# Patient Record
Sex: FEMALE | Race: WHITE | NOT HISPANIC OR LATINO | Employment: FULL TIME | URBAN - METROPOLITAN AREA
[De-identification: names, ages, dates, MRNs, and addresses within clinical notes are randomized per-mention and may not be internally consistent; named-entity substitution may affect disease eponyms.]

---

## 2017-01-05 ENCOUNTER — DOCUMENTATION (OUTPATIENT)
Dept: OBSTETRICS AND GYNECOLOGY | Facility: CLINIC | Age: 37
End: 2017-01-05

## 2017-01-05 NOTE — PROGRESS NOTES
Pt called and scheduled her annual exam on February 13th.  She nursed her daughter full time until September 2016, and stopped completely just two weeks ago.  She started spotting off and on after cutting back on nursing.  Since stopping nursing altogether, she has spotted everyday.  She had a Mirena IUD inserted after delivery. She just wants to make sure it's OK to wait until February until she is seen.  Pt was reassured that this spotting is not out of the norm with an IUD and also with someone who has just stopped nursing. We will see her in a month.

## 2017-01-19 PROBLEM — Z01.419 WELL WOMAN EXAM WITH ROUTINE GYNECOLOGICAL EXAM: Chronic | Status: ACTIVE | Noted: 2017-01-19

## 2017-02-13 ENCOUNTER — OFFICE VISIT (OUTPATIENT)
Dept: OBSTETRICS AND GYNECOLOGY | Facility: CLINIC | Age: 37
End: 2017-02-13

## 2017-02-13 VITALS
DIASTOLIC BLOOD PRESSURE: 66 MMHG | RESPIRATION RATE: 14 BRPM | WEIGHT: 128 LBS | BODY MASS INDEX: 22.68 KG/M2 | SYSTOLIC BLOOD PRESSURE: 106 MMHG | HEIGHT: 63 IN

## 2017-02-13 DIAGNOSIS — Z01.419 WELL WOMAN EXAM WITH ROUTINE GYNECOLOGICAL EXAM: Primary | Chronic | ICD-10-CM

## 2017-02-13 PROCEDURE — 99395 PREV VISIT EST AGE 18-39: CPT | Performed by: OBSTETRICS & GYNECOLOGY

## 2017-02-13 RX ORDER — PRENATAL VIT NO.126/IRON/FOLIC 28MG-0.8MG
TABLET ORAL DAILY
COMMUNITY
End: 2017-07-19

## 2017-02-13 NOTE — PROGRESS NOTES
Subjective   Chief Complaint   Patient presents with   • Gynecologic Exam     Lilliam Wilkins is a 36 y.o. year old  presenting to be seen for her annual exam.     SEXUAL Hx:  She is currently sexually active.  In the past year there has not been new sexual partners.    Condoms are not typically used.  She would not like to be screened for STD's at today's exam.  Current birth control method: IUD - Mirena.  She is happy with her current method of contraception and does not want to discuss alternative methods of contraception.  MENSTRUAL Hx:  No LMP recorded. Patient has had an implant.  In the past 6 months her cycles have been infrequent weaned 2017.   Her menstrual flow is light.   Each month on average there are roughly 0 day(s) of very heavy flow.    Intermenstrual bleeding is absent.    Post-coital bleeding is absent.  Dysmenorrhea: is not affecting her activities of daily living  PMS: is not affecting her activities of daily living  Her cycles are not a source of concern for her that she wishes to discuss today.  HEALTH Hx:  She exercises regularly: no (and has no plans to become more active).  She wears her seat belt:yes.  She has concerns about domestic violence: no.  OTHER COMPLAINTS:  none    The following portions of the patient's history were reviewed and updated as appropriate:problem list, current medications, allergies, past family history, past medical history, past social history and past surgical history.    Smoking status: Never Smoker                                                                 Smokeless status: Not on file                       Review of Systems  Consitutional POS: nothing reported    NEG: anorexia or night sweats   Gastointestinal POS: nothing reported    NEG: bloating, change in bowel habits, melena or reflux symptoms   Genitourinary POS: nothing reported    NEG: dysuria or hematuria   Integument POS: nothing reported    NEG: moles that are changing in size, shape,  "color or rashes   Breast POS: nothing reported    NEG: persistent breast lump, skin dimpling or nipple discharge        Objective   Visit Vitals   • /66   • Resp 14   • Ht 63\" (160 cm)   • Wt 128 lb (58.1 kg)   • Breastfeeding No   • BMI 22.67 kg/m2       General:  well developed; well nourished  no acute distress   Skin:  No suspicious lesions seen   Thyroid: normal to inspection and palpation   Breasts:  Examined in supine position  Symmetric without masses or skin dimpling  Nipples normal without inversion, lesions or discharge  There are no palpable axillary nodes   Abdomen: soft, non-tender; no masses  no umbilical or inginual hernias are present  no hepato-splenomegaly   Pelvis: Clinical staff was present for exam  External genitalia:  normal appearance of the external genitalia including Bartholin's and Ojus's glands.  :  urethral meatus normal; urethral hypermobility is absent.  Vaginal:  normal pink mucosa without prolapse or lesions.  Cervix:  normal appearance. IUD string present - 2 cms in length;  Uterus:  normal size, shape and consistency. retroverted;  Adnexa:  non palpable bilaterally.  Rectal:  digital rectal exam not performed; anus visually normal appearing.        Assessment   1. Normal GYN exam  2. H/O ASCUS with (-) HPV     Plan   1. Pap was done today.  If she does not receive the results of the Pap within 2 weeks  time, she was instructed to call to find out the results.  I explained to Lilliam that the recommendations for Pap smear interval in a low risk patient's has lengthened to 3 years time.  I encouraged her to be seen yearly for a full physical exam including breast and pelvic exam even during the off years when PAP's will not be performed.  2. Follow up for annual exam 1 year         This note was electronically signed.    Red Crespo M.D.  February 13, 2017  "

## 2017-07-19 ENCOUNTER — OFFICE VISIT (OUTPATIENT)
Dept: OBSTETRICS AND GYNECOLOGY | Facility: CLINIC | Age: 37
End: 2017-07-19

## 2017-07-19 VITALS
DIASTOLIC BLOOD PRESSURE: 62 MMHG | RESPIRATION RATE: 14 BRPM | WEIGHT: 127 LBS | SYSTOLIC BLOOD PRESSURE: 104 MMHG | BODY MASS INDEX: 22.5 KG/M2

## 2017-07-19 DIAGNOSIS — Z30.40 CONTRACEPTIVE USE: Primary | ICD-10-CM

## 2017-07-19 DIAGNOSIS — N92.6 IRREGULAR MENSES: ICD-10-CM

## 2017-07-19 PROCEDURE — 99213 OFFICE O/P EST LOW 20 MIN: CPT | Performed by: OBSTETRICS & GYNECOLOGY

## 2017-07-19 RX ORDER — CLINDAMYCIN PHOSPHATE 10 UG/ML
LOTION TOPICAL
COMMUNITY
Start: 2017-07-06 | End: 2022-02-17

## 2017-07-19 RX ORDER — CETIRIZINE HYDROCHLORIDE 10 MG/1
10 TABLET ORAL DAILY
COMMUNITY

## 2017-07-19 RX ORDER — ESCITALOPRAM OXALATE 10 MG/1
TABLET ORAL
COMMUNITY
Start: 2017-07-07 | End: 2018-11-19 | Stop reason: SDUPTHER

## 2017-07-19 NOTE — PROGRESS NOTES
Subjective   Chief Complaint   Patient presents with   • Contraception     discuss options     Lilliam Wilkins is a 37 y.o. year old .  Patient's last menstrual period was 2017 (approximate).  She presents to be seen because of trouble with her Mirena.  She's having issues with frequent bleeding.  She has a definite period monthly.  In the weeks that she is not supposed to be bleeding she'll bleed about half of those days.  She did have an ultrasound approximately 8 weeks after placement showing normal IUD placement.    There 100% certain done with conception.  He is willing to do vasectomy.    When she is not on birth control or pregnant her cycles are somewhat short but not excessively heavy or painful.  Been quite a while since she is used any form of contraception otherwise.  She think she would be a good pill taker potentially.    The following portions of the patient's history were reviewed and updated as appropriate:current medications and allergies    Smoking status: Never Smoker                                                                 Smokeless status: Not on file                            Objective   /62  Resp 14  Wt 127 lb (57.6 kg)  LMP 2017 (Approximate)  Breastfeeding? No  BMI 22.5 kg/m2    Lab Review   No data reviewed    Imaging   Pelvic ultrasound report        Assessment   1. Irregular menses with IUD  2. Desires alternative contraceptive methods  3. Irregular menses - affecting her activities of daily living.     Plan   1. Birth control options reviewed and compare including vasectomy, tubal, bilateral salpingectomy.  Additionally we talked about pros and cons of oral contraceptives, NuvaRing and Ortho Evra.  She wants to talk to her  and will get back to us.  2. I did explain that a short course of oral contraceptives may actually resolve the irregular menses that are related to the IUD.  She like to try this in the interim  3. Follow up PRN      New  Medications Ordered This Visit   Medications   • norethindrone-ethinyl estradiol (NECON 1/35, 28,) 1-35 MG-MCG per tablet     Sig: Take 1 tablet by mouth Daily.     Dispense:  28 tablet     Refill:  2          This note was electronically signed.    Red Crespo M.D.  July 19, 2017    Total time spent today with Lilliam  was 30 minutes (level 4).  Off this time, 100% was spent face-to-face time coordinating care, answering her questions and counseling regarding contraceptive choices and efficacy rates and pathophysiology of her presenting problem along with plans for any diagnositc work-up and treatment.

## 2017-07-19 NOTE — PROGRESS NOTES
"IUD Removal    Date of procedure:  7/19/2017    Risks and benefits discussed? yes  All questions answered? yes  Consents given by The patient  Written consent obtained? yes  Reason for removal: {IUD Removal Reason:21193}    Local anesthesia used:  {Local anesthesia used - Yes/No:79266}    Procedure documentation:    A speculum was placed in order to view the cervix.  A tenaculum {DID/DID NOT:40132} need to be placed on the anterior cervical lip.  Cervical dilation {DID/DID NOT:05823} need to be performed in order to access the string.  The IUD string {ACTIONS; WAS/WAS NOT:14911} easily seen.  The string was grasped and the IUD was removed {WITH OR WITHOUT:84760} difficulty.  The IUD {DID/DID NOT:42125} appear to be adherent to the uterine cavity. It {Actions; Was/Was No:35109} removed intact.    She tolerated the procedure without any difficulty.     Post procedure instructions: Patient notified to call with heavy bleeding, fever or increasing pain.    Follow up {F/U reason:85757::\"for annual exam\"} *** {follow-up time:22868}    This note was electronically signed.    Red Crespo M.D.  July 19, 2017      "

## 2018-03-21 ENCOUNTER — OFFICE VISIT (OUTPATIENT)
Dept: OBSTETRICS AND GYNECOLOGY | Facility: CLINIC | Age: 38
End: 2018-03-21

## 2018-03-21 VITALS
DIASTOLIC BLOOD PRESSURE: 68 MMHG | WEIGHT: 133 LBS | BODY MASS INDEX: 23.56 KG/M2 | SYSTOLIC BLOOD PRESSURE: 106 MMHG | RESPIRATION RATE: 14 BRPM

## 2018-03-21 DIAGNOSIS — N64.4 MASTODYNIA: Primary | ICD-10-CM

## 2018-03-21 PROBLEM — Z01.419 WELL WOMAN EXAM WITH ROUTINE GYNECOLOGICAL EXAM: Status: ACTIVE | Noted: 2017-01-19

## 2018-03-21 PROCEDURE — 99213 OFFICE O/P EST LOW 20 MIN: CPT | Performed by: OBSTETRICS & GYNECOLOGY

## 2018-03-21 NOTE — PROGRESS NOTES
Subjective   Chief Complaint   Patient presents with   • Breast Problem     left breast      Lilliam Wilkins is a 37 y.o. year old  presenting to be seen because of New onset left breast heaviness.  She's noticed nothing else's.  Specifically there's been no changes in the overlying skin or nipple discharge.  There is no palpable lumps.  Symptoms have been present for less than 1 week's time.    OTHER THINGS SHE WANTS TO DISCUSS TODAY:  Nothing else    The following portions of the patient's history were reviewed and updated as appropriate:current medications, allergies and past family history    Smoking status: Never Smoker                                                                 Smokeless tobacco: Not on file                       Review of Systems  Constitutional POS: nothing reported    NEG: anorexia or night sweats   Genitourinary POS: nothing reported    NEG: dysuria or hematuria   Gastointestinal POS: nothing reported    NEG: bloating, change in bowel habits, melena or reflux symptoms   Integument POS: nothing reported    NEG: moles that are changing in size, shape, color or rashes   Breast POS: see HPI    NEG: persistent breast lump, skin dimpling or nipple discharge        Objective   /68   Resp 14   Wt 60.3 kg (133 lb)   Breastfeeding? No   BMI 23.56 kg/m²     General:  well developed; well nourished  no acute distress   Breasts:  Examined in supine position  Symmetric without masses or skin dimpling  Nipples normal without inversion, lesions or discharge  There are no palpable axillary nodes  Fibrocystic changes are present both breasts without a discrete mass     Lab Review   No data reviewed    Imaging   No data reviewed       Assessment   1. Fibrocystic change of the breast w/o a discretely palpable mass     Plan   1. I reviewed with Lilliam that caffeine reduction may help reduce breast pains associated with fibrocystic change.  Furthermore I explained that vitamin E  400 units  twice daily may help to lessen the associated breast pains.  If she notices a discretely palpable lump or bloody nipple discharge associated with her breast pain, we need to be notified so that further testing can be scheduled.  2. The importance of keeping all planned follow-up and taking all medications as prescribed was emphasized.  3. Follow up PRN           This note was electronically signed.    Red Crespo M.D.  March 21, 2018    Note: Speech recognition transcription software may have been used to create portions of this document.  An attempt at proofreading has been made but errors in transcription could still be present.

## 2018-06-25 ENCOUNTER — OFFICE VISIT (OUTPATIENT)
Dept: OBSTETRICS AND GYNECOLOGY | Facility: CLINIC | Age: 38
End: 2018-06-25

## 2018-06-25 VITALS
SYSTOLIC BLOOD PRESSURE: 112 MMHG | DIASTOLIC BLOOD PRESSURE: 66 MMHG | RESPIRATION RATE: 14 BRPM | BODY MASS INDEX: 24.45 KG/M2 | WEIGHT: 138 LBS

## 2018-06-25 DIAGNOSIS — R14.0 BLOATING: ICD-10-CM

## 2018-06-25 DIAGNOSIS — Z01.419 WELL WOMAN EXAM WITH ROUTINE GYNECOLOGICAL EXAM: Primary | ICD-10-CM

## 2018-06-25 PROCEDURE — 99396 PREV VISIT EST AGE 40-64: CPT | Performed by: OBSTETRICS & GYNECOLOGY

## 2018-06-25 NOTE — PROGRESS NOTES
Subjective   Chief Complaint   Patient presents with   • Gynecologic Exam     Lilliam Wilkins is a 38 y.o. year old  presenting to be seen for her annual exam.     SEXUAL Hx:  She is currently sexually active.  In the past year there there has been NO new sexual partners.    Condoms are never used.  She would not like to be screened for STD's at today's exam.  Current birth control method: IUD - Mirena.  She is happy with her current method of contraception and does not want to discuss alternative methods of contraception.  MENSTRUAL Hx:  Patient's last menstrual period was 2018 (approximate).  In the past 6 months her cycles have been unpredictable infrequent.  Her menstrual flow is typically light.   Each month on average there are roughly 0 day(s) of very heavy flow.    Intermenstrual bleeding is absent.    Post-coital bleeding is absent.  Dysmenorrhea: is not affecting her activities of daily living  PMS: is not affecting her activities of daily living  Her cycles are not a source of concern for her that she wishes to discuss today.  HEALTH Hx:  She exercises regularly: yes.  She wears her seat belt: yes.  She has concerns about domestic violence: no.  OTHER THINGS SHE WANTS TO DISCUSS TODAY:  Nothing else    The following portions of the patient's history were reviewed and updated as appropriate:problem list, current medications, allergies, past family history, past medical history, past social history and past surgical history.    Smoking status: Never Smoker                                                              Smokeless tobacco: Never Used                        Review of Systems  Constitutional POS: nothing reported    NEG: anorexia or night sweats   Genitourinary POS: nothing reported    NEG: dysuria or hematuria      Gastointestinal POS: bloating    NEG: change in bowel habits, melena or reflux symptoms   Integument POS: nothing reported    NEG: moles that are changing in size, shape,  color or rashes   Breast POS: nothing reported    NEG: persistent breast lump, skin dimpling or nipple discharge        Objective   /66   Resp 14   Wt 62.6 kg (138 lb)   LMP 06/18/2018 (Approximate)   Breastfeeding? No   BMI 24.45 kg/m²     General:  well developed; well nourished  no acute distress   Skin:  No suspicious lesions seen   Thyroid: normal to inspection and palpation   Breasts:  Examined in supine position  Symmetric without masses or skin dimpling  Nipples normal without inversion, lesions or discharge  There are no palpable axillary nodes   Abdomen: soft, non-tender; no masses  no umbilical or inginual hernias are present  no hepato-splenomegaly   Pelvis: Clinical staff was present for exam  External genitalia:  normal appearance of the external genitalia including Bartholin's and Mabel's glands.  :  urethral meatus normal;  Vaginal:  normal pink mucosa without prolapse or lesions.  Cervix:  normal appearance. IUD string present - 2 cms in length;  Uterus:  normal size, shape and consistency. retroverted;  Adnexa:  normal bimanual exam of the adnexa.  Rectal:  digital rectal exam not performed; anus visually normal appearing.        Assessment   1. Normal GYN exam  2. Bloating.  This is a new finding that does need to be worked up further     Plan   1. Pap was not done today.  I explained to Lilliam that the recommendations for Pap smear interval in a low risk patient has lengthened to 3 years time.  I told Lilliam she still needs to be seen in our office yearly for a full physical including breast and pelvic exam.  2. Ultrasound needs to be done any time that is convenient for her.   3. She was encouraged to get yearly mammograms.  She should report any palpable breast lump(s) or skin changes regardless of mammographic findings.  I explained to Lilliam that notification regarding her mammogram results will come from the center performing the study.  Our office will not be routinely  calling with mammogram results.  It is her responsibility to make sure that the results from the mammogram are communicated to her by the breast center.  If she has any questions about the results, she is welcome to call our office anytime. The phone number to schedule a mammogram will be given to Lilliam today at the time of check out.  I explained that she should be able to call the center directly to schedule her screening mammogram without a physician's order.  So long as she gives them our name, a copy of the mammogram report should be sent to us for review.  4. The importance of keeping all planned follow-up and taking all medications as prescribed was emphasized.  5. Follow up after ultrasound.  If U/S normal consider checking TSH and serum calcium.         This note was electronically signed.    Red Crespo M.D.  June 25, 2018    Note: Speech recognition transcription software may have been used to create portions of this document.  An attempt at proofreading has been made but errors in transcription could still be present.

## 2018-07-05 ENCOUNTER — APPOINTMENT (OUTPATIENT)
Dept: LAB | Facility: HOSPITAL | Age: 38
End: 2018-07-05

## 2018-07-05 ENCOUNTER — OFFICE VISIT (OUTPATIENT)
Dept: OBSTETRICS AND GYNECOLOGY | Facility: CLINIC | Age: 38
End: 2018-07-05

## 2018-07-05 VITALS — WEIGHT: 139 LBS | RESPIRATION RATE: 14 BRPM | BODY MASS INDEX: 24.62 KG/M2

## 2018-07-05 DIAGNOSIS — R53.83 FATIGUE, UNSPECIFIED TYPE: ICD-10-CM

## 2018-07-05 DIAGNOSIS — R14.0 BLOATING: Primary | ICD-10-CM

## 2018-07-05 LAB
CALCIUM SPEC-SCNC: 8.9 MG/DL (ref 8.7–10.4)
TSH SERPL DL<=0.05 MIU/L-ACNC: 1.46 MIU/ML (ref 0.35–5.35)

## 2018-07-05 PROCEDURE — 82310 ASSAY OF CALCIUM: CPT | Performed by: OBSTETRICS & GYNECOLOGY

## 2018-07-05 PROCEDURE — 99213 OFFICE O/P EST LOW 20 MIN: CPT | Performed by: OBSTETRICS & GYNECOLOGY

## 2018-07-05 PROCEDURE — 36415 COLL VENOUS BLD VENIPUNCTURE: CPT | Performed by: OBSTETRICS & GYNECOLOGY

## 2018-07-05 PROCEDURE — 84443 ASSAY THYROID STIM HORMONE: CPT | Performed by: OBSTETRICS & GYNECOLOGY

## 2018-07-05 NOTE — PROGRESS NOTES
Subjective   Chief Complaint   Patient presents with   • Imaging Only     Lilliam Wilkins is a 38 y.o. year old .  Patient's last menstrual period was 2018 (approximate).  She presents to be seen because of follow-up on her bloating and fatigue.  She does not have issues with constipation.    The following portions of the patient's history were reviewed and updated as appropriate:current medications and allergies    Smoking status: Never Smoker                                                              Smokeless tobacco: Never Used                             Objective   Resp 14   Wt 63 kg (139 lb)   LMP 2018 (Approximate)   Breastfeeding? No   BMI 24.62 kg/m²     Lab Review   No data reviewed    Imaging   Pelvic ultrasound report        Assessment   1. Bloating with normal ultrasound - suspected represents some element of GI dysfunction.  2. Fatigue.  This is a new problem for which additional workup is indicated     Plan   1. Have encouraged her initially to eliminate gluten from her diet and see if this helps her symptoms.  If this does not help I would then try eliminating lactose  2. The following data needs to be obtained to update her medical records: recent labs from PCP.  3. The following tests were ordered today: TSH and Ca.  It was explained to Lilliam that all lab test should be back within the one week after they are performed. She will be notified about the results, regardless of the findings. If she has not been contacted by the office within 2 weeks after the test has been performed, it is her responsibility to contact us to learn about her results.  4. The importance of keeping all planned follow-up and taking all medications as prescribed was emphasized.  5. Follow up for recheck of Sx 2-3 months           This note was electronically signed.    Red Crespo M.D.  2018    Total time spent today with Lilliam  was 20 minutes (level 3).  Off this time, 100% was  spent face-to-face time coordinating care, answering her questions and counseling regarding pathophysiology of her presenting problem along with plans for any diagnositc work-up and treatment.    Note: Speech recognition transcription software may have been used to create portions of this document.  An attempt at proofreading has been made but errors in transcription could still be present.

## 2018-07-06 ENCOUNTER — DOCUMENTATION (OUTPATIENT)
Dept: OBSTETRICS AND GYNECOLOGY | Facility: CLINIC | Age: 38
End: 2018-07-06

## 2018-07-06 NOTE — PROGRESS NOTES
Outside records from primary care ph from September, 2017 reviewed  · CBC normal  · Urinalysis normal  · Lipid panel normal  · Comprehensive metabolic panel including calcium normal  · TSH normal      Red Crespo M.D.  July 6, 2018  1:01 PM

## 2018-09-05 ENCOUNTER — OFFICE VISIT (OUTPATIENT)
Dept: OBSTETRICS AND GYNECOLOGY | Facility: CLINIC | Age: 38
End: 2018-09-05

## 2018-09-05 VITALS — BODY MASS INDEX: 23.74 KG/M2 | RESPIRATION RATE: 14 BRPM | WEIGHT: 134 LBS

## 2018-09-05 DIAGNOSIS — K90.41 GLUTEN ENTEROPATHY: ICD-10-CM

## 2018-09-05 DIAGNOSIS — R14.0 BLOATING: Primary | ICD-10-CM

## 2018-09-05 PROCEDURE — 99213 OFFICE O/P EST LOW 20 MIN: CPT | Performed by: OBSTETRICS & GYNECOLOGY

## 2018-09-05 RX ORDER — DAPSONE 50 MG/G
GEL TOPICAL
COMMUNITY
Start: 2018-07-27 | End: 2022-02-17

## 2018-09-05 NOTE — PROGRESS NOTES
Subjective   Chief Complaint   Patient presents with   • Bloated     Lilliam Wilkins is a 38 y.o. year old .  No LMP recorded (lmp unknown). Patient has had an implant.  She presents to be seen because of follow-up from her diet.  She has essentially quit eating gluten and feels remarkably better.  The bloating is now gone.  Fatigue is essentially gone.  She is down about 5 pounds.  Her rings fit better.  She used to be a painful swollen and now let's better as well too.  Overall she feels much better and is happy with how things are going.    The following portions of the patient's history were reviewed and updated as appropriate:current medications and allergies    Smoking status: Never Smoker                                                              Smokeless tobacco: Never Used                             Objective   Resp 14   Wt 60.8 kg (134 lb)   LMP  (LMP Unknown)   Breastfeeding? No   BMI 23.74 kg/m²     Lab Review   No data reviewed    Imaging   No data reviewed        Assessment   1. Gluten enteropathy significantly improved with dietary change     Plan   1. The importance of keeping all planned follow-up and taking all medications as prescribed was emphasized.  2. Follow up PRN            This note was electronically signed.    Red Crespo M.D.  2018    Total time spent today with Lilliam  was 20 minutes (level 3).  Off this time, 100% was spent face-to-face time coordinating care, answering her questions and counseling regarding pathophysiology of her presenting problem along with plans for any diagnositc work-up and treatment.    Note: Speech recognition transcription software may have been used to create portions of this document.  An attempt at proofreading has been made but errors in transcription could still be present.

## 2018-11-19 ENCOUNTER — TELEPHONE (OUTPATIENT)
Dept: OBSTETRICS AND GYNECOLOGY | Facility: CLINIC | Age: 38
End: 2018-11-19

## 2018-11-19 RX ORDER — ESCITALOPRAM OXALATE 10 MG/1
10 TABLET ORAL DAILY
Qty: 30 TABLET | Refills: 5 | Status: SHIPPED | OUTPATIENT
Start: 2018-11-19 | End: 2019-05-31 | Stop reason: SDUPTHER

## 2018-11-19 NOTE — TELEPHONE ENCOUNTER
New Medications Ordered This Visit   Medications   • escitalopram (LEXAPRO) 10 MG tablet     Sig: Take 1 tablet by mouth Daily.     Dispense:  30 tablet     Refill:  5     Please call her and let her know it is done

## 2018-11-19 NOTE — TELEPHONE ENCOUNTER
Dr Crespo    Pt calling to see if you can write a prescription escitalopram 10 mg. Tablet once a day.    Pt call back [phone]    [facility] [phone]

## 2019-06-03 RX ORDER — ESCITALOPRAM OXALATE 10 MG/1
TABLET ORAL
Qty: 30 TABLET | Refills: 1 | Status: SHIPPED | OUTPATIENT
Start: 2019-06-03 | End: 2019-08-08 | Stop reason: SDUPTHER

## 2019-08-08 RX ORDER — ESCITALOPRAM OXALATE 10 MG/1
TABLET ORAL
Qty: 30 TABLET | Refills: 0 | Status: SHIPPED | OUTPATIENT
Start: 2019-08-08 | End: 2019-09-03 | Stop reason: SDUPTHER

## 2019-08-08 NOTE — TELEPHONE ENCOUNTER
Patient is due for her annual exam with Dr. Crespo so I will send in Lexapro 10mg #30 no refills to [facility] in [place] with a message advising patient she is due for her annual.

## 2019-09-03 ENCOUNTER — TELEPHONE (OUTPATIENT)
Dept: OBSTETRICS AND GYNECOLOGY | Facility: CLINIC | Age: 39
End: 2019-09-03

## 2019-09-03 RX ORDER — ESCITALOPRAM OXALATE 10 MG/1
10 TABLET ORAL DAILY
Qty: 30 TABLET | Refills: 0 | Status: SHIPPED | OUTPATIENT
Start: 2019-09-03 | End: 2019-10-16 | Stop reason: SDUPTHER

## 2019-09-03 NOTE — TELEPHONE ENCOUNTER
Zak mathews called requesting refill on Lexapro be sent to [facility]. Has annual scheduled for 11/1/2019

## 2019-10-16 RX ORDER — ESCITALOPRAM OXALATE 10 MG/1
TABLET ORAL
Qty: 30 TABLET | Refills: 4 | Status: SHIPPED | OUTPATIENT
Start: 2019-10-16 | End: 2020-03-13

## 2019-11-01 ENCOUNTER — OFFICE VISIT (OUTPATIENT)
Dept: OBSTETRICS AND GYNECOLOGY | Facility: CLINIC | Age: 39
End: 2019-11-01

## 2019-11-01 VITALS
RESPIRATION RATE: 14 BRPM | BODY MASS INDEX: 24.09 KG/M2 | SYSTOLIC BLOOD PRESSURE: 106 MMHG | DIASTOLIC BLOOD PRESSURE: 74 MMHG | WEIGHT: 136 LBS

## 2019-11-01 DIAGNOSIS — Z01.419 WELL WOMAN EXAM WITH ROUTINE GYNECOLOGICAL EXAM: Primary | ICD-10-CM

## 2019-11-01 PROCEDURE — 99396 PREV VISIT EST AGE 40-64: CPT | Performed by: OBSTETRICS & GYNECOLOGY

## 2020-01-31 ENCOUNTER — APPOINTMENT (OUTPATIENT)
Dept: LAB | Facility: HOSPITAL | Age: 40
End: 2020-01-31

## 2020-01-31 ENCOUNTER — TRANSCRIBE ORDERS (OUTPATIENT)
Dept: LAB | Facility: HOSPITAL | Age: 40
End: 2020-01-31

## 2020-01-31 DIAGNOSIS — L70.8 OTHER ACNE: Primary | ICD-10-CM

## 2020-01-31 LAB — POTASSIUM BLD-SCNC: 4.1 MMOL/L (ref 3.5–5.2)

## 2020-01-31 PROCEDURE — 84132 ASSAY OF SERUM POTASSIUM: CPT | Performed by: PEDIATRICS

## 2020-01-31 PROCEDURE — 36415 COLL VENOUS BLD VENIPUNCTURE: CPT | Performed by: PEDIATRICS

## 2020-03-04 ENCOUNTER — TRANSCRIBE ORDERS (OUTPATIENT)
Dept: ADMINISTRATIVE | Facility: HOSPITAL | Age: 40
End: 2020-03-04

## 2020-03-13 RX ORDER — ESCITALOPRAM OXALATE 10 MG/1
TABLET ORAL
Qty: 30 TABLET | Refills: 3 | Status: SHIPPED | OUTPATIENT
Start: 2020-03-13 | End: 2020-07-16

## 2020-07-16 RX ORDER — ESCITALOPRAM OXALATE 10 MG/1
TABLET ORAL
Qty: 30 TABLET | Refills: 2 | Status: SHIPPED | OUTPATIENT
Start: 2020-07-16 | End: 2020-10-15

## 2020-10-15 RX ORDER — ESCITALOPRAM OXALATE 10 MG/1
TABLET ORAL
Qty: 30 TABLET | Refills: 1 | Status: SHIPPED | OUTPATIENT
Start: 2020-10-15 | End: 2020-12-18

## 2020-10-15 NOTE — TELEPHONE ENCOUNTER
Please let her know I sent a prescription refill in.  Also let her know that the Mirena which she is to have a 5-year indication now has a 6-year indication.  If she thinks it needs to be changed she may actually have 1 additional year left.

## 2020-11-04 ENCOUNTER — OFFICE VISIT (OUTPATIENT)
Dept: OBSTETRICS AND GYNECOLOGY | Facility: CLINIC | Age: 40
End: 2020-11-04

## 2020-11-04 ENCOUNTER — TRANSCRIBE ORDERS (OUTPATIENT)
Dept: ADMINISTRATIVE | Facility: HOSPITAL | Age: 40
End: 2020-11-04

## 2020-11-04 VITALS
SYSTOLIC BLOOD PRESSURE: 118 MMHG | RESPIRATION RATE: 14 BRPM | WEIGHT: 141 LBS | DIASTOLIC BLOOD PRESSURE: 70 MMHG | BODY MASS INDEX: 24.98 KG/M2

## 2020-11-04 DIAGNOSIS — Z71.85 VACCINE COUNSELING: ICD-10-CM

## 2020-11-04 DIAGNOSIS — Z01.419 WELL WOMAN EXAM WITH ROUTINE GYNECOLOGICAL EXAM: Primary | ICD-10-CM

## 2020-11-04 DIAGNOSIS — Z12.31 VISIT FOR SCREENING MAMMOGRAM: Primary | ICD-10-CM

## 2020-11-04 PROCEDURE — 99396 PREV VISIT EST AGE 40-64: CPT | Performed by: OBSTETRICS & GYNECOLOGY

## 2020-11-04 RX ORDER — SPIRONOLACTONE 100 MG/1
TABLET, FILM COATED ORAL DAILY
COMMUNITY

## 2020-11-04 NOTE — PATIENT INSTRUCTIONS
Tdap Vaccine (Tetanus, Diphtheria and Pertussis): What You Need to Know      1. Why get vaccinated?  Tetanus, diphtheria and pertussis are very serious diseases. Tdap vaccine can protect us from these diseases. And, Tdap vaccine given to pregnant women can protect  babies against pertussis..  TETANUS (Lockjaw) is rare in the [place] today. It causes painful muscle tightening and stiffness, usually all over the body.  · It can lead to tightening of muscles in the head and neck so you can't open your mouth, swallow, or sometimes even breathe. Tetanus kills about 1 out of 10 people who are infected even after receiving the best medical care.  DIPHTHERIA is also rare in the [place] today. It can cause a thick coating to form in the back of the throat.  · It can lead to breathing problems, heart failure, paralysis, and death.  PERTUSSIS (Whooping Cough) causes severe coughing spells, which can cause difficulty breathing, vomiting and disturbed sleep.  · It can also lead to weight loss, incontinence, and rib fractures. Up to 2 in 100 adolescents and 5 in 100 adults with pertussis are hospitalized or have complications, which could include pneumonia or death.    These diseases are caused by bacteria. Diphtheria and pertussis are spread from person to person through secretions from coughing or sneezing. Tetanus enters the body through cuts, scratches, or wounds.  Before vaccines, as many as 200,000 cases of diphtheria, 200,000 cases of pertussis, and hundreds of cases of tetanus, were reported in the [place] each year. Since vaccination began, reports of cases for tetanus and diphtheria have dropped by about 99% and for pertussis by about 80%.    2. Tdap vaccine  • Tdap vaccine can protect adolescents and adults from tetanus, diphtheria, and pertussis. One dose of Tdap is routinely given at age 11 or 12. People who did not get Tdap at that age should get it as soon as possible.  • Tdap is especially  important for healthcare professionals and anyone having close contact with a baby younger than 12 months.  • Pregnant women should get a dose of Tdap during every pregnancy, to protect the  from pertussis. Infants are most at risk for severe, life-threatening complications from pertussis.  • Another vaccine, called Td, protects against tetanus and diphtheria, but not pertussis. A Td booster should be given every 10 years. Tdap may be given as one of these boosters if you have never gotten Tdap before. Tdap may also be given after a severe cut or burn to prevent tetanus infection.  • Your doctor or the person giving you the vaccine can give you more information.  • Tdap may safely be given at the same time as other vaccines.    3. Some people should not get this vaccine  · A person who has ever had a life-threatening allergic reaction after a previous dose of any diphtheria, tetanus or pertussis containing vaccine, OR has a severe allergy to any part of this vaccine, should not get Tdap vaccine. Tell the person giving the vaccine about any severe allergies.  · Anyone who had coma or long repeated seizures within 7 days after a childhood dose of DTP or DTaP, or a previous dose of Tdap, should not get Tdap, unless a cause other than the vaccine was found. They can still get Td.  · Talk to your doctor if you:  ? have seizures or another nervous system problem,  ? had severe pain or swelling after any vaccine containing diphtheria, tetanus or pertussis,  ? ever had a condition called Guillain-Barré Syndrome (GBS),  ? aren't feeling well on the day the shot is scheduled.    4. Risks  With any medicine, including vaccines, there is a chance of side effects. These are usually mild and go away on their own. Serious reactions are also possible but are rare.  Most people who get Tdap vaccine do not have any problems with it.  Mild problems following Tdap  (Did not interfere with activities)  · Pain where the shot was  given (about 3 in 4 adolescents or 2 in 3 adults)  · Redness or swelling where the shot was given (about 1 person in 5)  · Mild fever of at least 100.4°F (up to about 1 in 25 adolescents or 1 in 100 adults)  · Headache (about 3 or 4 people in 10)  · Tiredness (about 1 person in 3 or 4)  · Nausea, vomiting, diarrhea, stomach ache (up to 1 in 4 adolescents or 1 in 10 adults)  · Chills, sore joints (about 1 person in 10)  · Body aches (about 1 person in 3 or 4)  · Rash, swollen glands (uncommon)  Moderate problems following Tdap  (Interfered with activities, but did not require medical attention)  · Pain where the shot was given (up to 1 in 5 or 6)  · Redness or swelling where the shot was given (up to about 1 in 16 adolescents or 1 in 12 adults)  · Fever over 102°F (about 1 in 100 adolescents or 1 in 250 adults)  · Headache (about 1 in 7 adolescents or 1 in 10 adults)  · Nausea, vomiting, diarrhea, stomach ache (up to 1 or 3 people in 100)  · Swelling of the entire arm where the shot was given (up to about 1 in 500).  Severe problems following Tdap  (Unable to perform usual activities; required medical attention)  · Swelling, severe pain, bleeding and redness in the arm where the shot was given (rare).  Problems that could happen after any vaccine:  · People sometimes faint after a medical procedure, including vaccination. Sitting or lying down for about 15 minutes can help prevent fainting, and injuries caused by a fall. Tell your doctor if you feel dizzy, or have vision changes or ringing in the ears.  · Some people get severe pain in the shoulder and have difficulty moving the arm where a shot was given. This happens very rarely.  · Any medication can cause a severe allergic reaction. Such reactions from a vaccine are very rare, estimated at fewer than 1 in a million doses, and would happen within a few minutes to a few hours after the vaccination.  · As with any medicine, there is a very remote chance of a vaccine  causing a serious injury or death.  · The safety of vaccines is always being monitored. For more information, visit: www.cdc.gov/vaccinesafety/    5. What if there is a serious problem?  What should I look for?  · Look for anything that concerns you, such as signs of a severe allergic reaction, very high fever, or unusual behavior.  · Signs of a severe allergic reaction can include hives, swelling of the face and throat, difficulty breathing, a fast heartbeat, dizziness, and weakness. These would usually start a few minutes to a few hours after the vaccination.  What should I do?  · If you think it is a severe allergic reaction or other emergency that can't wait, call 9-1-1 or get the person to the nearest hospital. Otherwise, call your doctor.  · Afterward, the reaction should be reported to the Vaccine Adverse Event Reporting System (VAERS). Your doctor might file this report, or you can do it yourself through the VAERS web site at www.vaers.[facility].gov, or by calling 1-217.806.9763.  · VAERS does not give medical advice.    6. The National Vaccine Injury Compensation Program  The National Vaccine Injury Compensation Program (VICP) is a federal program that was created to compensate people who may have been injured by certain vaccines.  Persons who believe they may have been injured by a vaccine can learn about the program and about filing a claim by calling 1-942.531.3343 or visiting the VICP website at www.hrsa.gov/vaccinecompensation. There is a time limit to file a claim for compensation.    7. How can I learn more?  · Ask your doctor. He or she can give you the vaccine package insert or suggest other sources of information.  · Call your local or state health department.  · Contact the Centers for Disease Control and Prevention (CDC):  ? Call 1-551.987.9320 (2-678-GRA-INFO) or  ? Visit CDC's website at www.cdc.gov/vaccines      Vaccine Information Statement Tdap Vaccine (2/24/2015)  This information is not  intended to replace advice given to you by your health care provider. Make sure you discuss any questions you have with your health care provider.  Document Released: 06/18/2013 Document Revised: 08/05/2019 Document Reviewed: 08/05/2019  Elsevier Interactive Patient Education © 2019 [employer] Inc[employer]

## 2020-11-04 NOTE — PROGRESS NOTES
Subjective   Chief Complaint   Patient presents with   • Gynecologic Exam     Lilliam Wilkins is a 40 y.o. year old  presenting to be seen for her annual exam.  Kids are all doing well.  They are looking to go to [employer] in the coming school year.    SEXUAL Hx:  She is currently sexually active.  In the past year there there has been NO new sexual partners.    Condoms are never used.  She would not like to be screened for STD's at today's exam.  Current birth control method: IUD - Mirena.  She is happy with her current method of contraception and does not want to discuss alternative methods of contraception.  MENSTRUAL Hx:  No LMP recorded (lmp unknown). Patient has had an implant.  In the past 6 months her cycles have been unpredictable.  Her menstrual flow is typically light.   Each month on average there are roughly 0 day(s) of very heavy flow.  Intermenstrual bleeding is absent.    Post-coital bleeding is absent.  Dysmenorrhea: is not affecting her activities of daily living  PMS: is not affecting her activities of daily living  Her cycles are not a source of concern for her that she wishes to discuss today.  HEALTH Hx:  She exercises regularly: yes.  She wears her seat belt: yes.  She has concerns about domestic violence: no.  OTHER THINGS SHE WANTS TO DISCUSS TODAY:  Nothing else    The following portions of the patient's history were reviewed and updated as appropriate:problem list, current medications, allergies, past family history, past medical history, past social history and past surgical history.    Social History    Tobacco Use      Smoking status: Never Smoker      Smokeless tobacco: Never Used    Review of Systems  Constitutional POS: nothing reported    NEG: anorexia or night sweats   Genitourinary POS: nothing reported    NEG: dysuria or hematuria      Gastointestinal POS: nothing reported    NEG: bloating, change in bowel habits, melena or reflux symptoms   Integument POS: nothing reported and  she sees her dermatologist for routine skins exams    NEG: moles that are changing in size, shape, color or rashes   Breast POS: nothing reported    NEG: persistent breast lump, skin dimpling or nipple discharge        Objective   /70   Resp 14   Wt 64 kg (141 lb)   LMP  (LMP Unknown)   Breastfeeding No   BMI 24.98 kg/m²     General:  well developed; well nourished  no acute distress   Skin:  No suspicious lesions seen   Thyroid: normal to inspection and palpation   Breasts:  Examined in supine position  Symmetric without masses or skin dimpling  Nipples normal without inversion, lesions or discharge  There are no palpable axillary nodes   Abdomen: soft, non-tender; no masses  no umbilical or inguinal hernias are present  no hepato-splenomegaly   Pelvis: Clinical staff was present for exam  External genitalia:  normal appearance of the external genitalia including Bartholin's and Lobeco's glands.  :  urethral meatus normal;  Vaginal:  normal pink mucosa without prolapse or lesions.  Cervix:  normal appearance. IUD string present - 1.5 cms in length;  Uterus:  normal size, shape and consistency.  Adnexa:  normal bimanual exam of the adnexa.  Rectal:  digital rectal exam not performed; anus visually normal appearing.        Assessment   1. Normal GYN exam  2. Oligomenorrhea - most consistent with Mirena use     Plan   1. Pap was done today.  If she does not receive the results of the Pap within 2 weeks  time, she was instructed to call to find out the results.  I explained to Lilliam that the recommendations for Pap smear interval in a low risk patient's has lengthened to 3 years time.  I encouraged her to be seen yearly for a full physical exam including breast and pelvic exam even during the off years when PAP's will not be performed.  2. She was encouraged to get yearly mammograms.  She should report any palpable breast lump(s) or skin changes regardless of mammographic findings.  I explained to Lilliam  that notification regarding her mammogram results will come from the center performing the study.  Our office will not be routinely calling with mammogram results.  It is her responsibility to make sure that the results from the mammogram are communicated to her by the breast center.  If she has any questions about the results, she is welcome to call our office anytime. The phone number to schedule a mammogram will be given to Lilliam today at the time of check out.  I explained that she should be able to call the center directly to schedule her screening mammogram without a physician's order.  So long as she gives them our name, a copy of the mammogram report should be sent to us for review.  3. I discussed with Lilliam that she may be behind on needed vaccinations for Influenza and TDAP.  She may be able to obtain these vaccinations at her local pharmacy OR speak about obtaining them with her primary care.  If she does obtain her vaccines, I have asked Lillima to let us know the date each vaccine was obtained so that her medical record could be updated in our system.  4. The importance of keeping all planned follow-up and taking all medications as prescribed was emphasized.  5. Follow up for annual exam 1 year         This note was electronically signed.    Red Crespo M.D.  November 4, 2020    Note: Speech recognition transcription software may have been used to create portions of this document.  An attempt at proofreading has been made but errors in transcription could still be present.

## 2020-12-18 RX ORDER — ESCITALOPRAM OXALATE 10 MG/1
TABLET ORAL
Qty: 30 TABLET | Refills: 0 | Status: SHIPPED | OUTPATIENT
Start: 2020-12-18 | End: 2021-01-28 | Stop reason: SDUPTHER

## 2021-01-27 ENCOUNTER — HOSPITAL ENCOUNTER (OUTPATIENT)
Dept: MAMMOGRAPHY | Facility: HOSPITAL | Age: 41
Discharge: HOME | End: 2021-01-27
Admitting: OBSTETRICS & GYNECOLOGY

## 2021-01-27 DIAGNOSIS — Z12.31 VISIT FOR SCREENING MAMMOGRAM: ICD-10-CM

## 2021-01-27 PROCEDURE — 77063 BREAST TOMOSYNTHESIS BI: CPT | Performed by: RADIOLOGY

## 2021-01-27 PROCEDURE — 77067 SCR MAMMO BI INCL CAD: CPT | Performed by: RADIOLOGY

## 2021-01-27 PROCEDURE — 77063 BREAST TOMOSYNTHESIS BI: CPT

## 2021-01-27 PROCEDURE — 77067 SCR MAMMO BI INCL CAD: CPT

## 2021-01-28 RX ORDER — ESCITALOPRAM OXALATE 10 MG/1
10 TABLET ORAL DAILY
Qty: 30 TABLET | Refills: 0 | Status: SHIPPED | OUTPATIENT
Start: 2021-01-28

## 2021-01-28 NOTE — TELEPHONE ENCOUNTER
CONOR      PT NEED A ONE MONTH REFILL ON ESCITALOPRAM UNTIL SHE SEES HER PRIMARY DOC IN TWO WEEKS      PHARM:  [facility] IN [place]

## 2021-01-29 NOTE — TELEPHONE ENCOUNTER
Spoke with patient and advised her that we sent in the prescription refill for her to her pharmacy.  Patient verbalized understanding and had no questions at this time.

## 2021-02-22 ENCOUNTER — APPOINTMENT (OUTPATIENT)
Dept: MAMMOGRAPHY | Facility: HOSPITAL | Age: 41
End: 2021-02-22

## 2021-03-10 ENCOUNTER — HOSPITAL ENCOUNTER (OUTPATIENT)
Dept: ULTRASOUND IMAGING | Facility: HOSPITAL | Age: 41
Discharge: HOME | End: 2021-03-10

## 2021-03-10 ENCOUNTER — HOSPITAL ENCOUNTER (OUTPATIENT)
Dept: MAMMOGRAPHY | Facility: HOSPITAL | Age: 41
Discharge: HOME | End: 2021-03-10

## 2021-03-10 DIAGNOSIS — R92.8 ABNORMAL MAMMOGRAM: ICD-10-CM

## 2021-03-10 PROCEDURE — 77061 BREAST TOMOSYNTHESIS UNI: CPT | Performed by: RADIOLOGY

## 2021-03-10 PROCEDURE — 76642 ULTRASOUND BREAST LIMITED: CPT | Performed by: RADIOLOGY

## 2021-03-10 PROCEDURE — 76642 ULTRASOUND BREAST LIMITED: CPT

## 2021-03-10 PROCEDURE — 77065 DX MAMMO INCL CAD UNI: CPT

## 2021-03-10 PROCEDURE — G0279 TOMOSYNTHESIS, MAMMO: HCPCS

## 2021-03-10 PROCEDURE — 77065 DX MAMMO INCL CAD UNI: CPT | Performed by: RADIOLOGY

## 2021-05-19 ENCOUNTER — LAB (OUTPATIENT)
Dept: LAB | Facility: HOSPITAL | Age: 41
End: 2021-05-19

## 2021-05-19 ENCOUNTER — TRANSCRIBE ORDERS (OUTPATIENT)
Dept: LAB | Facility: HOSPITAL | Age: 41
End: 2021-05-19

## 2021-05-19 DIAGNOSIS — Z00.00 ROUTINE GENERAL MEDICAL EXAMINATION AT A HEALTH CARE FACILITY: Primary | ICD-10-CM

## 2021-05-19 DIAGNOSIS — Z00.00 ROUTINE GENERAL MEDICAL EXAMINATION AT A HEALTH CARE FACILITY: ICD-10-CM

## 2021-05-19 LAB
ALBUMIN SERPL-MCNC: 4.3 G/DL (ref 3.5–5.2)
ALBUMIN/GLOB SERPL: 1.4 G/DL
ALP SERPL-CCNC: 52 U/L (ref 39–117)
ALT SERPL W P-5'-P-CCNC: 19 U/L (ref 1–33)
ANION GAP SERPL CALCULATED.3IONS-SCNC: 9.5 MMOL/L (ref 5–15)
AST SERPL-CCNC: 21 U/L (ref 1–32)
BACTERIA UR QL AUTO: ABNORMAL /HPF
BASOPHILS # BLD AUTO: 0.03 10*3/MM3 (ref 0–0.2)
BASOPHILS NFR BLD AUTO: 0.5 % (ref 0–1.5)
BILIRUB SERPL-MCNC: 0.2 MG/DL (ref 0–1.2)
BILIRUB UR QL STRIP: NEGATIVE
BUN SERPL-MCNC: 16 MG/DL (ref 6–20)
BUN/CREAT SERPL: 23.2 (ref 7–25)
CALCIUM SPEC-SCNC: 9.3 MG/DL (ref 8.6–10.5)
CHLORIDE SERPL-SCNC: 103 MMOL/L (ref 98–107)
CHOLEST SERPL-MCNC: 143 MG/DL (ref 0–200)
CLARITY UR: CLEAR
CO2 SERPL-SCNC: 25.5 MMOL/L (ref 22–29)
COLOR UR: YELLOW
CREAT SERPL-MCNC: 0.69 MG/DL (ref 0.57–1)
DEPRECATED RDW RBC AUTO: 42.9 FL (ref 37–54)
EOSINOPHIL # BLD AUTO: 0.16 10*3/MM3 (ref 0–0.4)
EOSINOPHIL NFR BLD AUTO: 2.7 % (ref 0.3–6.2)
ERYTHROCYTE [DISTWIDTH] IN BLOOD BY AUTOMATED COUNT: 12.6 % (ref 12.3–15.4)
GFR SERPL CREATININE-BSD FRML MDRD: 93 ML/MIN/1.73
GLOBULIN UR ELPH-MCNC: 3.1 GM/DL
GLUCOSE SERPL-MCNC: 95 MG/DL (ref 65–99)
GLUCOSE UR STRIP-MCNC: NEGATIVE MG/DL
HCT VFR BLD AUTO: 37.8 % (ref 34–46.6)
HDLC SERPL-MCNC: 56 MG/DL (ref 40–60)
HGB BLD-MCNC: 12.8 G/DL (ref 12–15.9)
HGB UR QL STRIP.AUTO: NEGATIVE
HYALINE CASTS UR QL AUTO: ABNORMAL /LPF
IMM GRANULOCYTES # BLD AUTO: 0.01 10*3/MM3 (ref 0–0.05)
IMM GRANULOCYTES NFR BLD AUTO: 0.2 % (ref 0–0.5)
KETONES UR QL STRIP: NEGATIVE
LDLC SERPL CALC-MCNC: 74 MG/DL (ref 0–100)
LDLC/HDLC SERPL: 1.34 {RATIO}
LEUKOCYTE ESTERASE UR QL STRIP.AUTO: NEGATIVE
LYMPHOCYTES # BLD AUTO: 2.79 10*3/MM3 (ref 0.7–3.1)
LYMPHOCYTES NFR BLD AUTO: 47 % (ref 19.6–45.3)
MCH RBC QN AUTO: 31.5 PG (ref 26.6–33)
MCHC RBC AUTO-ENTMCNC: 33.9 G/DL (ref 31.5–35.7)
MCV RBC AUTO: 93.1 FL (ref 79–97)
MONOCYTES # BLD AUTO: 0.58 10*3/MM3 (ref 0.1–0.9)
MONOCYTES NFR BLD AUTO: 9.8 % (ref 5–12)
NEUTROPHILS NFR BLD AUTO: 2.37 10*3/MM3 (ref 1.7–7)
NEUTROPHILS NFR BLD AUTO: 39.8 % (ref 42.7–76)
NITRITE UR QL STRIP: NEGATIVE
NRBC BLD AUTO-RTO: 0 /100 WBC (ref 0–0.2)
PH UR STRIP.AUTO: 6 [PH] (ref 5–8)
PLATELET # BLD AUTO: 242 10*3/MM3 (ref 140–450)
PMV BLD AUTO: 12.8 FL (ref 6–12)
POTASSIUM SERPL-SCNC: 4.5 MMOL/L (ref 3.5–5.2)
PROT SERPL-MCNC: 7.4 G/DL (ref 6–8.5)
PROT UR QL STRIP: NEGATIVE
RBC # BLD AUTO: 4.06 10*6/MM3 (ref 3.77–5.28)
RBC # UR: ABNORMAL /HPF
REF LAB TEST METHOD: ABNORMAL
SODIUM SERPL-SCNC: 138 MMOL/L (ref 136–145)
SP GR UR STRIP: 1.02 (ref 1–1.03)
SQUAMOUS #/AREA URNS HPF: ABNORMAL /HPF
TRIGL SERPL-MCNC: 61 MG/DL (ref 0–150)
TSH SERPL DL<=0.05 MIU/L-ACNC: 2.21 UIU/ML (ref 0.27–4.2)
UROBILINOGEN UR QL STRIP: NORMAL
VLDLC SERPL-MCNC: 13 MG/DL (ref 5–40)
WBC # BLD AUTO: 5.94 10*3/MM3 (ref 3.4–10.8)
WBC UR QL AUTO: ABNORMAL /HPF

## 2021-05-19 PROCEDURE — 80053 COMPREHEN METABOLIC PANEL: CPT

## 2021-05-19 PROCEDURE — 80061 LIPID PANEL: CPT

## 2021-05-19 PROCEDURE — 81001 URINALYSIS AUTO W/SCOPE: CPT

## 2021-05-19 PROCEDURE — 36415 COLL VENOUS BLD VENIPUNCTURE: CPT

## 2021-05-19 PROCEDURE — 84443 ASSAY THYROID STIM HORMONE: CPT

## 2021-05-19 PROCEDURE — 82043 UR ALBUMIN QUANTITATIVE: CPT

## 2021-05-19 PROCEDURE — 82570 ASSAY OF URINE CREATININE: CPT

## 2021-05-19 PROCEDURE — 85025 COMPLETE CBC W/AUTO DIFF WBC: CPT

## 2021-05-20 LAB
ALBUMIN UR-MCNC: <1.2 MG/DL
CREAT UR-MCNC: 138 MG/DL
MICROALBUMIN/CREAT UR: NORMAL MG/G{CREAT}

## 2021-11-03 ENCOUNTER — TELEPHONE (OUTPATIENT)
Dept: OBSTETRICS AND GYNECOLOGY | Facility: CLINIC | Age: 41
End: 2021-11-03

## 2021-11-03 NOTE — TELEPHONE ENCOUNTER
Advised pt her IUD was placed in October 2015 and will be effective until October 2022. Voiced understanding.

## 2021-11-03 NOTE — TELEPHONE ENCOUNTER
Zak Sandoval was unsure when her IUD was placed, wants to make sure its still okay and will prevent her from having babies. She made her annual appt for Feb 17th, and will need to schedule another appt to remove and replace IUD.

## 2022-02-16 NOTE — PROGRESS NOTES
"Subjective   No chief complaint on file.    Lilliam Wilkins is a 41 y.o. year old  presenting to be seen for her annual exam.     SEXUAL Hx:  She {IS/is not:77921::\"is\"} currently sexually active.  In the past year there {New sexual partners?:08551::\"there has been NO new sexual partners\"}.    Condoms {Condom use:41747::\"are never used\"}.  She {would/WOULD NOT:11934::\"would not\"} like to be screened for STD's at today's exam.  Current birth control method: {Current contraceptive:79435}.  She {IS/is not:64682::\"is\"} happy with her current method of contraception and {does / DOES NOT:23166::\"does not\"} want to discuss alternative methods of contraception.  MENSTRUAL Hx:  No LMP recorded. Patient has had an implant.  In the past 6 months her cycles have been {Desc - menses description:78526::\"regular, predictable and occur monthly\"}.  Her menstrual {Misc; menses description:12025::\"flow is typically normal\"}.   Each month on average there are roughly {Numbers; 0-7:51328::\"0\"} day(s) of very heavy flow.  Intermenstrual bleeding is {absent/present:40576::\"absent\"}.    Post-coital bleeding is {absent/present:50395::\"absent\"}.  Dysmenorrhea: {Affecting ADL?:30866::\"is not affecting her activities of daily living\"}  PMS: {Affecting ADL?:08906::\"is not affecting her activities of daily living\"}  Her cycles {are/ARE NOT:07811::\"are not\"} a source of concern for her that she wishes to discuss today.  HEALTH Hx:  She exercises regularly: {Responses; yes/NO/not asked:43396::\"no (and has no plans to become more active)\"}.  She wears her seat belt: {Responses; yes/not/not always (pre-select yes):38064::\"yes\"}.  She has concerns about domestic violence: {Responses; yes/NO/not asked:74015::\"no\"}.  OTHER THINGS SHE WANTS TO DISCUSS TODAY:  {Other complaints:05586::\"Nothing else\"}    The following portions of the patient's history were reviewed and updated as appropriate:{Misc - History reviewed:27945::\"problem list\",\"current " "medications\",\"allergies\",\"past family history\",\"past medical history\",\"past social history\",\"past surgical history\"}.    Social History    Tobacco Use      Smoking status: Never Smoker      Smokeless tobacco: Never Used    Review of Systems  Constitutional POS: {Constitutional (+) ROS:12302::\"nothing reported\"}    NEG: {Constitutional (-) ROS:77995::\"anorexia\",\"night sweats\"}   Genitourinary POS: { (+) ROS:32210::\"nothing reported\"}    NEG: { (-) ROS:20105::\"dysuria\",\"hematuria\"}      Gastointestinal POS: {GI (+) ROS:23900::\"nothing reported\"}    NEG: {GI (-) ROS:48119::\"bloating\",\"change in bowel habits\",\"melena\",\"reflux symptoms\"}   Integument POS: {Skin (+) ROS:65303::\"nothing reported\"}    NEG: {Skin (-) ROS:42390::\"moles that are changing in size, shape, color\",\"rashes\"}   Breast POS: {Breast (+) ROS:49836::\"nothing reported\"}    NEG: {Breast (-) ROS:86660::\"persistent breast lump\",\"skin dimpling\",\"nipple discharge\"}        Objective   There were no vitals taken for this visit.    General:  {Exam - general findings:41561::\"well developed; well nourished\",\"no acute distress\"}   Skin:  {Exam - skin:07221::\"No suspicious lesions seen\"}   Thyroid: {Exam - thyroid:29783::\"normal to inspection and palpation\"}   Breasts:  {Exam - breasts:73614::\"Examined in supine position\",\"Symmetric without masses or skin dimpling\",\"Nipples normal without inversion, lesions or discharge\",\"There are no palpable axillary nodes\"}   Abdomen: {Exam - abdomen:66580::\"soft, non-tender; no masses\",\"no umbilical or inguinal hernias are present\",\"no hepato-splenomegaly\"}   Pelvis: {Exam; GYN pelvic:40806::\"Clinical staff was present for exam\"}        Assessment   1. ***  2. {Screening exam update:83992::\"She is up to date on all relevant gynecologic and colorectal screenings\"}     Plan   1. Pap was not done today.  I explained to Lilliam that the recommendations for Pap smear interval in a low risk patient has lengthened to 3 years time.  " "I told Lilliam she still needs to be seen in our office yearly for a full physical including breast and pelvic exam.  2. She was encouraged to get yearly mammograms.  She should report any palpable breast lump(s) or skin changes regardless of mammographic findings.  I explained to Lilliam that notification regarding her mammogram results will come from the center performing the study.  Our office will not be routinely calling with mammogram results.  It is her responsibility to make sure that the results from the mammogram are communicated to her by the breast center.  If she has any questions about the results, she is welcome to call our office anytime.  3. Reviewed with Lilliam that Brittney now has a 7-year indication  4. Her vaccine record was reviewed and updated.  5. I discussed with Lilliam that she may be behind on needed vaccinations for {Vaccines to update:28645::\"Influenza\"}.  She may be able to obtain these vaccinations at her local pharmacy OR speak about obtaining them with her primary care.  If she does obtain her vaccines, I have asked Lilliam to let us know the date each vaccine was obtained so that her medical record could be updated in our system.  6. The importance of keeping all planned follow-up and taking all medications as prescribed was emphasized.  7. Follow up for IUD replacement ~ 9/2022     No orders of the defined types were placed in this encounter.         This note was electronically signed.    Red Crespo M.D.  February 16, 2022    Part of this note may be an electronic transcription/translation of spoken language to printed text using the Dragon Dictation System.   "

## 2022-02-17 ENCOUNTER — OFFICE VISIT (OUTPATIENT)
Dept: OBSTETRICS AND GYNECOLOGY | Facility: CLINIC | Age: 42
End: 2022-02-17

## 2022-02-17 VITALS
BODY MASS INDEX: 26.75 KG/M2 | SYSTOLIC BLOOD PRESSURE: 116 MMHG | DIASTOLIC BLOOD PRESSURE: 74 MMHG | RESPIRATION RATE: 14 BRPM | WEIGHT: 151 LBS

## 2022-02-17 DIAGNOSIS — Z30.431 CHECKING OF INTRAUTERINE DEVICE: ICD-10-CM

## 2022-02-17 DIAGNOSIS — Z01.419 WELL WOMAN EXAM WITH ROUTINE GYNECOLOGICAL EXAM: Primary | ICD-10-CM

## 2022-02-17 DIAGNOSIS — Z71.85 VACCINE COUNSELING: ICD-10-CM

## 2022-02-17 PROCEDURE — 99396 PREV VISIT EST AGE 40-64: CPT | Performed by: OBSTETRICS & GYNECOLOGY

## 2022-02-17 RX ORDER — ESCITALOPRAM OXALATE 10 MG/1
TABLET ORAL
COMMUNITY
End: 2022-02-17 | Stop reason: SDUPTHER

## 2022-02-17 NOTE — PROGRESS NOTES
Subjective   Chief Complaint   Patient presents with   • Gynecologic Exam     Lilliam Wilkins is a 41 y.o. year old  presenting to be seen for her annual exam.     SEXUAL Hx:  She is currently sexually active.  In the past year there there has been NO new sexual partners.    Condoms are never used.  She would not like to be screened for STD's at today's exam.  Current birth control method: IUD - Mirena.  She is happy with her current method of contraception and does not want to discuss alternative methods of contraception.  MENSTRUAL Hx:  In the past 6 months her cycles have been infrequent.  Her menstrual flow is typically light.   Each month on average there are roughly 0 day(s) of very heavy flow.  Intermenstrual bleeding is absent.    Post-coital bleeding is absent.  Dysmenorrhea: is not affecting her activities of daily living  PMS: is not affecting her activities of daily living  Her cycles are not a source of concern for her that she wishes to discuss today.  HEALTH Hx:  She exercises regularly: yes.  She wears her seat belt: yes.  She has concerns about domestic violence: no.  OTHER THINGS SHE WANTS TO DISCUSS TODAY:  Nothing else    The following portions of the patient's history were reviewed and updated as appropriate:problem list, current medications, allergies, past family history, past medical history, past social history and past surgical history.    Social History    Tobacco Use      Smoking status: Never Smoker      Smokeless tobacco: Never Used    Review of Systems  Constitutional POS: nothing reported    NEG: anorexia or night sweats   Genitourinary POS: nothing reported    NEG: dysuria or hematuria      Gastointestinal POS: nothing reported    NEG: bloating, change in bowel habits, melena or reflux symptoms   Integument POS: nothing reported and she sees her dermatologist for routine skins exams    NEG: moles that are changing in size, shape, color or rashes   Breast POS: nothing reported     NEG: persistent breast lump, skin dimpling or nipple discharge        Objective   /74   Resp 14   Wt 68.5 kg (151 lb)   Breastfeeding No   BMI 26.75 kg/m²     General:  well developed; well nourished  no acute distress   Skin:  No suspicious lesions seen   Thyroid: normal to inspection and palpation   Breasts:  Examined in supine position  Symmetric without masses or skin dimpling  Nipples normal without inversion, lesions or discharge  There are no palpable axillary nodes   Abdomen: soft, non-tender; no masses  no umbilical or inguinal hernias are present  no hepato-splenomegaly   Pelvis: Clinical staff was present for exam  External genitalia:  normal appearance of the external genitalia including Bartholin's and [place]'s glands.  :  urethral meatus normal;  Vaginal:  normal pink mucosa without prolapse or lesions.  Cervix:  normal appearance. IUD string present - 2 cms in length;  Uterus:  normal size, shape and consistency. retroverted;  Adnexa:  normal bimanual exam of the adnexa.  Rectal:  digital rectal exam not performed; anus visually normal appearing.        Assessment   1. Normal GYN exam  2. Oligomenorrhea - most consistent with Mirena use  3. She is up to date on all relevant gynecologic and colorectal screenings     Plan   1. Pap was not done today.  I explained to Lilliam that the recommendations for Pap smear interval in a low risk patient has lengthened to 3 years time.  I told iLlliam she still needs to be seen in our office yearly for a full physical including breast and pelvic exam.  2. She was encouraged to get yearly mammograms.  She should report any palpable breast lump(s) or skin changes regardless of mammographic findings.  I explained to Lilliam that notification regarding her mammogram results will come from the center performing the study.  Our office will not be routinely calling with mammogram results.  It is her responsibility to make sure that the results from the  mammogram are communicated to her by the breast center.  If she has any questions about the results, she is welcome to call our office anytime.  3. Her vaccine record was reviewed and updated.  4. I discussed with Lilliam that she may be behind on needed vaccinations for Influenza and COVID booster.  She may be able to obtain these vaccinations at her local pharmacy OR speak about obtaining them with her primary care.  If she does obtain her vaccines, I have asked Lilliam to let us know the date each vaccine was obtained so that her medical record could be updated in our system.  5. The importance of keeping all planned follow-up and taking all medications as prescribed was emphasized.  6. Follow up IUD change ~ 9/2022         This note was electronically signed.    Red Crespo M.D.  February 17, 2022    Part of this note may be an electronic transcription/translation of spoken language to printed text using the Dragon Dictation System.

## 2023-05-24 ENCOUNTER — TRANSCRIBE ORDERS (OUTPATIENT)
Dept: LAB | Facility: HOSPITAL | Age: 43
End: 2023-05-24
Payer: COMMERCIAL

## 2023-05-24 ENCOUNTER — LAB (OUTPATIENT)
Dept: LAB | Facility: HOSPITAL | Age: 43
End: 2023-05-24
Payer: COMMERCIAL

## 2023-05-24 DIAGNOSIS — L70.8 OTHER ACNE: Primary | ICD-10-CM

## 2023-05-24 DIAGNOSIS — L70.8 OTHER ACNE: ICD-10-CM

## 2023-05-24 PROCEDURE — 80048 BASIC METABOLIC PNL TOTAL CA: CPT

## 2023-05-24 PROCEDURE — 36415 COLL VENOUS BLD VENIPUNCTURE: CPT

## 2023-05-25 LAB
ANION GAP SERPL CALCULATED.3IONS-SCNC: 7.7 MMOL/L (ref 5–15)
BUN SERPL-MCNC: 14 MG/DL (ref 6–20)
BUN/CREAT SERPL: 20.3 (ref 7–25)
CALCIUM SPEC-SCNC: 9.6 MG/DL (ref 8.6–10.5)
CHLORIDE SERPL-SCNC: 102 MMOL/L (ref 98–107)
CO2 SERPL-SCNC: 27.3 MMOL/L (ref 22–29)
CREAT SERPL-MCNC: 0.69 MG/DL (ref 0.57–1)
EGFRCR SERPLBLD CKD-EPI 2021: 109.2 ML/MIN/1.73
GLUCOSE SERPL-MCNC: 86 MG/DL (ref 65–99)
POTASSIUM SERPL-SCNC: 4.6 MMOL/L (ref 3.5–5.2)
SODIUM SERPL-SCNC: 137 MMOL/L (ref 136–145)

## 2024-03-04 ENCOUNTER — TRANSCRIBE ORDERS (OUTPATIENT)
Dept: ADMINISTRATIVE | Facility: HOSPITAL | Age: 44
End: 2024-03-04
Payer: COMMERCIAL

## 2024-03-04 DIAGNOSIS — R92.8 ABNORMAL MAMMOGRAM: Primary | ICD-10-CM

## 2024-06-07 ENCOUNTER — OFFICE VISIT (OUTPATIENT)
Dept: OBSTETRICS AND GYNECOLOGY | Facility: CLINIC | Age: 44
End: 2024-06-07
Payer: COMMERCIAL

## 2024-06-07 VITALS
BODY MASS INDEX: 25.52 KG/M2 | HEIGHT: 63 IN | SYSTOLIC BLOOD PRESSURE: 100 MMHG | DIASTOLIC BLOOD PRESSURE: 70 MMHG | WEIGHT: 144 LBS

## 2024-06-07 DIAGNOSIS — Z30.433 ENCOUNTER FOR IUD REMOVAL AND REINSERTION: Primary | ICD-10-CM

## 2024-06-07 RX ORDER — MISOPROSTOL 200 UG/1
TABLET ORAL
Qty: 2 TABLET | Refills: 0 | Status: SHIPPED | OUTPATIENT
Start: 2024-06-07

## 2024-06-07 NOTE — PROGRESS NOTES
IUD Removal and Immediate Reinsertion    No LMP recorded (lmp unknown). Patient has had an implant.    Date of procedure:  6/7/2024    Risks and benefits discussed? yes  All questions answered? yes  Consents given by the patient  Written consent obtained? yes  Reason for removal: Device expiration    Local anesthesia used:  no    Procedure documentation:    A speculum was placed in order to view the cervix.  A tenaculum did not need to be placed on the anterior cervical lip.  Cervical dilation did not need to be performed in order to access the string.  The IUD string was easily seen.  The string was grasped and the IUD was removed without difficulty.  The IUD did not appear to be adherent to the uterine cavity. It was removed intact.    A sterile speculum was replaced and the cervix was cleansed with an antiseptic solution.  Vaginal discharge was scant.  The anterior lip of the cervix was grasped with a tenaculum and the uterine cavity was gently sounded.  There was moderate difficulty passing the sound through the cervix.  Cervical dilation did need to be performed prior to placing the IUD.  The uterus was retroverted and sounded to 7 cms.  The Mirena was then prepared per the manufacturers instructions.    The Mirena was advanced to a point 2 cms from the fundus and then the arms were released from the sheath.  The device was advanced to the fundus and the device was released fully from the sheath.. The string was cut 3 cms in length.  Bleeding from the cervix was scant.  Transvaginal ultrasound in office today shows IUD shows iud malpositioned, just past cervix and endometrial canal.  Dr. Baylon called for assistance.  Dr. Urban removed Mirena IUD without difficulty.  Dr. Urban attempted IUD reinsertion under ultrasound guidance and was unable to sound to the uterine fundus.    She tolerated the procedure without any difficulty.     Post procedure instructions: Pain and bleeding precautions reviewed.  Will  have patient take Cytotec prior to IUD insertion.    Schedule IUD reinsertion in 2 weeks with Dr. Urban.    This note was electronically signed.  Tricia Hendrix, APRN  June 7, 2024

## 2024-06-24 ENCOUNTER — OFFICE VISIT (OUTPATIENT)
Dept: OBSTETRICS AND GYNECOLOGY | Facility: CLINIC | Age: 44
End: 2024-06-24
Payer: COMMERCIAL

## 2024-06-24 VITALS
HEIGHT: 63 IN | BODY MASS INDEX: 25.69 KG/M2 | SYSTOLIC BLOOD PRESSURE: 130 MMHG | DIASTOLIC BLOOD PRESSURE: 88 MMHG | WEIGHT: 145 LBS

## 2024-06-24 DIAGNOSIS — Z30.430 ENCOUNTER FOR INSERTION OF MIRENA IUD: Primary | ICD-10-CM

## 2024-06-24 LAB
B-HCG UR QL: NEGATIVE
EXPIRATION DATE: NORMAL
INTERNAL NEGATIVE CONTROL: NEGATIVE
INTERNAL POSITIVE CONTROL: POSITIVE
Lab: NORMAL

## 2024-06-24 PROCEDURE — 81025 URINE PREGNANCY TEST: CPT | Performed by: OBSTETRICS & GYNECOLOGY

## 2024-06-24 PROCEDURE — 58300 INSERT INTRAUTERINE DEVICE: CPT | Performed by: OBSTETRICS & GYNECOLOGY

## 2024-06-24 NOTE — PROGRESS NOTES
IUD Insertion    No LMP recorded (lmp unknown). Patient has had an implant.  Negative UPT  No unprotected intercourse in past ~ 2 weeks   Pre procedure dx: desires LNG IU   Post procedure dx: same     Date of procedure:  6/24/2024    Risks and benefits discussed? yes  All questions answered? yes  Consents given by The patient  Written consent obtained? Yes  TAUS demonstrated uterus in AV position today     Local anesthesia used:  no    Procedure documentation:    After verifying the patient had a low probability of being pregnant and met the criteria for insertion, a sterile speculum has placed and the cervix was cleansed with an antiseptic solution.  Vaginal discharge was scant.  The anterior lip of the cervix was grasped with a tenaculum and the uterine cavity was gently sounded. There was no difficulty passing the sound through the cervix.  Cervical dilation did not need to be performed prior to placing the IUD.  The uterus was anteverted and sounded to 7 cms.  The Mirena was then prepared per the manufacturers instructions.    The Mirena was advanced to a point 2 cms from the fundus and then the arms were released from the sheath.  The device was advanced to the fundus and the device was released fully from the sheath. The string was cut 3 cms in length.  Bleeding from the cervix was scant.    TAUS after procedure showed IUD in fundal position.     She tolerated the procedure without any difficulty.     Post procedure instructions: It was reviewed that the Mirena will not alter the timing of when she bleeds but it may decrease the quantity of flow and cramps.  Roughly 30% of people will be amenorrheic over time.  Efficacy rate of 99.2% over 8 years was discussed.  Spontaneous expulsion rate of 1-2% was also discussed.  If she has any issue with fever or excessive bleeding or pain she is to call the office immediately.  Otherwise I would like to see her back in 6 weeks for a IUD string check.     This note was  electronically signed.    Yessica Urban MD  June 24, 2024

## 2024-08-28 ENCOUNTER — TELEPHONE (OUTPATIENT)
Dept: OBSTETRICS AND GYNECOLOGY | Facility: CLINIC | Age: 44
End: 2024-08-28
Payer: COMMERCIAL

## 2024-08-28 DIAGNOSIS — N83.202 LEFT OVARIAN CYST: Primary | ICD-10-CM

## 2024-08-28 NOTE — TELEPHONE ENCOUNTER
Please inform patient her recent pelvic ultrasound shows her IUD to be in the correct position. There was a small cyst on the left ovary that appears benign. I do want to follow this up with repeat imaging in ~ 6 weeks. I am happy to see her that same day if she desires. Please let me know if she has any questions or concerns.     Orders Placed This Encounter   Procedures    US Non-ob Transvaginal     Standing Status:   Future     Standing Expiration Date:   8/28/2025     Order Specific Question:   Reason for Exam:     Answer:   Recheck ovarian cyst     Order Specific Question:   Release to patient     Answer:   Routine Release [[id number]]       Thanks, Yessica Urban MD

## 2024-08-29 NOTE — TELEPHONE ENCOUNTER
Pt informed and stated understanding.  Pt stated that she normally sees Dr. Crespo and would like to stay with him if possible.  I have her scheduled for 10/28/24 with an ultrasound and visit with Dr. Crespo.

## 2024-09-23 ENCOUNTER — HOSPITAL ENCOUNTER (OUTPATIENT)
Dept: MAMMOGRAPHY | Facility: HOSPITAL | Age: 44
Discharge: HOME | End: 2024-09-23
Admitting: OBSTETRICS & GYNECOLOGY
Payer: COMMERCIAL

## 2024-09-23 ENCOUNTER — TRANSCRIBE ORDERS (OUTPATIENT)
Dept: ADMINISTRATIVE | Facility: HOSPITAL | Age: 44
End: 2024-09-23
Payer: COMMERCIAL

## 2024-09-23 DIAGNOSIS — R92.8 ABNORMAL MAMMOGRAM: Primary | ICD-10-CM

## 2024-09-23 DIAGNOSIS — R92.8 ABNORMAL MAMMOGRAM: ICD-10-CM

## 2024-09-23 LAB
NCCN CRITERIA FLAG: NORMAL
TYRER CUZICK SCORE: 9.2

## 2024-09-23 PROCEDURE — 77066 DX MAMMO INCL CAD BI: CPT

## 2024-09-23 PROCEDURE — 77062 BREAST TOMOSYNTHESIS BI: CPT | Performed by: RADIOLOGY

## 2024-09-23 PROCEDURE — G0279 TOMOSYNTHESIS, MAMMO: HCPCS

## 2024-09-23 PROCEDURE — 77066 DX MAMMO INCL CAD BI: CPT | Performed by: RADIOLOGY

## 2024-09-30 ENCOUNTER — HOSPITAL ENCOUNTER (OUTPATIENT)
Dept: MAMMOGRAPHY | Facility: HOSPITAL | Age: 44
Discharge: HOME | End: 2024-09-30
Payer: COMMERCIAL

## 2024-09-30 DIAGNOSIS — R92.8 ABNORMAL MAMMOGRAM: ICD-10-CM

## 2024-09-30 PROCEDURE — C1819 TISSUE LOCALIZATION-EXCISION: HCPCS

## 2024-09-30 PROCEDURE — 19081 BX BREAST 1ST LESION STRTCTC: CPT | Performed by: RADIOLOGY

## 2024-09-30 PROCEDURE — 25010000002 LIDOCAINE 1 % SOLUTION: Performed by: RADIOLOGY

## 2024-09-30 PROCEDURE — 88305 TISSUE EXAM BY PATHOLOGIST: CPT | Performed by: RADIOLOGY

## 2024-09-30 PROCEDURE — 77065 DX MAMMO INCL CAD UNI: CPT | Performed by: RADIOLOGY

## 2024-09-30 RX ORDER — LIDOCAINE HYDROCHLORIDE AND EPINEPHRINE 10; 10 MG/ML; UG/ML
10 INJECTION, SOLUTION INFILTRATION; PERINEURAL ONCE
Status: COMPLETED | OUTPATIENT
Start: 2024-09-30 | End: 2024-09-30

## 2024-09-30 RX ORDER — LIDOCAINE HYDROCHLORIDE 10 MG/ML
12 INJECTION, SOLUTION INFILTRATION; PERINEURAL ONCE
Status: COMPLETED | OUTPATIENT
Start: 2024-09-30 | End: 2024-09-30

## 2024-09-30 RX ADMIN — LIDOCAINE HYDROCHLORIDE,EPINEPHRINE BITARTRATE 10 ML: 10; .01 INJECTION, SOLUTION INFILTRATION; PERINEURAL at 10:27

## 2024-09-30 RX ADMIN — LIDOCAINE HYDROCHLORIDE 8 ML: 10 INJECTION, SOLUTION INFILTRATION; PERINEURAL at 10:27

## 2024-09-30 NOTE — PROGRESS NOTES
Alert and oriented. Denies discomfort, no active bleeding, steri-strips visualized, gauze dressing applied. Cold pack given. Questions answered, support given. Verbalizes and demonstrates understanding of post-care instructions, written copy given.

## 2024-10-01 ENCOUNTER — TELEPHONE (OUTPATIENT)
Dept: MAMMOGRAPHY | Facility: HOSPITAL | Age: 44
End: 2024-10-01
Payer: COMMERCIAL

## 2024-10-01 LAB
CYTO UR: NORMAL
LAB AP CASE REPORT: NORMAL
LAB AP CLINICAL INFORMATION: NORMAL
LAB AP DIAGNOSIS COMMENT: NORMAL
PATH REPORT.FINAL DX SPEC: NORMAL
PATH REPORT.GROSS SPEC: NORMAL

## 2024-10-01 NOTE — TELEPHONE ENCOUNTER
Attempted to notify patient of pathology and recommendations, left message requesting patient return call.    Show Applicator Variable?: Yes Post-Care Instructions: I reviewed with the patient in detail both written and verbal post care instructions. Avoid swimming until the treated area heals. patient verbalizes understanding. Written instructions provided. Written instructions provided written instructions provided Detail Level: Detailed Duration Of Freeze Thaw-Cycle (Seconds): 8 Application Tool (Optional): Liquid Nitrogen Sprayer Consent: The patient's consent was obtained including but not limited to risks of crusting, scabbing, blistering, scarring, darker or lighter pigmentary change, recurrence, incomplete removal and infection. Number Of Freeze-Thaw Cycles: 1 freeze-thaw cycle

## 2024-10-02 ENCOUNTER — TELEPHONE (OUTPATIENT)
Dept: MAMMOGRAPHY | Facility: HOSPITAL | Age: 44
End: 2024-10-02
Payer: COMMERCIAL

## 2024-10-04 ENCOUNTER — TELEPHONE (OUTPATIENT)
Dept: MAMMOGRAPHY | Facility: HOSPITAL | Age: 44
End: 2024-10-04
Payer: COMMERCIAL

## 2024-10-11 ENCOUNTER — TELEPHONE (OUTPATIENT)
Dept: OBSTETRICS AND GYNECOLOGY | Facility: CLINIC | Age: 44
End: 2024-10-11
Payer: COMMERCIAL

## 2024-10-11 ENCOUNTER — OFFICE VISIT (OUTPATIENT)
Dept: OBSTETRICS AND GYNECOLOGY | Facility: CLINIC | Age: 44
End: 2024-10-11
Payer: COMMERCIAL

## 2024-10-11 ENCOUNTER — TELEPHONE (OUTPATIENT)
Dept: MAMMOGRAPHY | Facility: HOSPITAL | Age: 44
End: 2024-10-11
Payer: COMMERCIAL

## 2024-10-11 VITALS
WEIGHT: 143 LBS | SYSTOLIC BLOOD PRESSURE: 124 MMHG | BODY MASS INDEX: 25.34 KG/M2 | HEIGHT: 63 IN | DIASTOLIC BLOOD PRESSURE: 82 MMHG

## 2024-10-11 DIAGNOSIS — N61.0 CELLULITIS OF BREAST: Primary | ICD-10-CM

## 2024-10-11 DIAGNOSIS — Z98.890 H/O LEFT BREAST BIOPSY: ICD-10-CM

## 2024-10-11 RX ORDER — AZITHROMYCIN 250 MG/1
TABLET, FILM COATED ORAL
COMMUNITY
Start: 2024-09-14

## 2024-10-11 RX ORDER — SPIRONOLACTONE 50 MG/1
TABLET, FILM COATED ORAL
COMMUNITY
Start: 2024-07-15

## 2024-10-11 RX ORDER — SULFAMETHOXAZOLE/TRIMETHOPRIM 800-160 MG
1 TABLET ORAL 2 TIMES DAILY
Qty: 14 TABLET | Refills: 0 | Status: SHIPPED | OUTPATIENT
Start: 2024-10-11 | End: 2024-10-18

## 2024-10-11 NOTE — TELEPHONE ENCOUNTER
Patient states she is taking amoxicillin 875mg 125mg, twice daily of her daughter's left over prescription. She has been taking it since last night. Rash started 10/9. Rash is spreading. It is more red about 0.5in around it and states that it is more firm where it is red.   She states that it is minimally open, about 0.1 inch, and does not feel that it has closed all of the way. She feels that it does look healed and not raw.   No red streaking, drainage, pain.     Patient was on phone and has been asked to send in a picture to be able to send to a provider to see if an appt is needed at this time. Once message is received, it will be forwarded to provider.

## 2024-10-11 NOTE — TELEPHONE ENCOUNTER
Duplicate message Responded with picture in [employer] message     Pt scheduled for an appt per Dr. Mayberry. She has been instructed to call breast center as well.

## 2024-10-11 NOTE — PROGRESS NOTES
"Subjective   Chief Complaint   Patient presents with    Follow-up     Had breat biopsy last Monday, developed rash on left breast. Rash is on inner side, really uncomfortable. Tried hydrocortisone, did not help.     Lilliam Wilkins is a 44 y.o. year old  presenting to be seen for rash on her breast. Patient underwent breast biopsy last  for findings on a screening mammogram. Pathology was benign. Yesterday, patient developed redness and severe itching around the area of the biopsy site on her left breast. She has had pain and itching since and cannot wear a bra. No fever or chills at home. She does have a history of MRSA infection > 15 years ago.      Objective   /82   Ht 160 cm (62.99\")   Wt 64.9 kg (143 lb)   LMP  (LMP Unknown)   BMI 25.34 kg/m²     General:  well developed; well nourished  no acute distress  mentation appropriate   Skin:  Rash noted on breast as below   Thyroid: not examined   Breasts:  Left breast with biopsy incision and erythema approximately 2 cm in diameter  around incision site. Papular rash on underside of left breast.    Abdomen: soft, non-tender; no masses  No rash on abdomen   Pelvis: Not performed.        Assessment      Plan   Breast cellulitis  Small area of erythema around incision site consistent with cellulitis after biopsy.   Given patient's history of MRSA in the past, will send in Bactrim x 7 days.   Continue to use hydrocortisone cream for itching  Patient instructed to call office with any fever, chills, worsening symptoms.       Follow up in 1-2 weeks.     New Medications Ordered This Visit   Medications    sulfamethoxazole-trimethoprim (Bactrim DS) 800-160 MG per tablet     Sig: Take 1 tablet by mouth 2 (Two) Times a Day for 7 days.     Dispense:  14 tablet     Refill:  0          This note was electronically signed.    Lynne Mayberry MD  Obstetrics and Gynecology  [facility]     "

## 2024-10-11 NOTE — TELEPHONE ENCOUNTER
Had a breast biopsy 9/30 and the strips they put on stayed on until Tuesday night. 10/8    Patient now has a rash that has spread. It's very itchy   Patient is also concerned that there is a white spot on the incision area.     She's been applying neosporin on the incision, and an anti itch cream on the rash. Patient stated that she started taking amoxicillin that her daughter had left over.      Please advise.     Patient # [phone]

## 2024-10-11 NOTE — TELEPHONE ENCOUNTER
Returned patient's call. She states that yesterday she developed an itchy rash around the area of her biopsy incision, and that there is about a quarter sized reddened area surrounding the incision site. No warmth, drainage, pain or fever reported. She reports she has eczema and a history of sensitive skin, and she reports the rash reminds her of a heat rash. She has contacted her referring provider and sent a photo to them, they are seeing her today. Reinforced calling [facility] with any further needs, verbalized understanding, radiologist notified.

## 2024-10-28 ENCOUNTER — OFFICE VISIT (OUTPATIENT)
Dept: OBSTETRICS AND GYNECOLOGY | Facility: CLINIC | Age: 44
End: 2024-10-28
Payer: COMMERCIAL

## 2024-10-28 VITALS — RESPIRATION RATE: 14 BRPM | WEIGHT: 144 LBS | BODY MASS INDEX: 25.51 KG/M2

## 2024-10-28 DIAGNOSIS — Z30.431 CHECKING OF INTRAUTERINE DEVICE: ICD-10-CM

## 2024-10-28 DIAGNOSIS — N83.202 LEFT OVARIAN CYST: Primary | ICD-10-CM

## 2024-10-28 PROBLEM — N61.0 CELLULITIS OF BREAST: Status: RESOLVED | Noted: 2024-10-11 | Resolved: 2024-10-28

## 2024-10-28 PROCEDURE — 99212 OFFICE O/P EST SF 10 MIN: CPT | Performed by: OBSTETRICS & GYNECOLOGY

## 2024-10-28 NOTE — PROGRESS NOTES
Subjective   Chief Complaint   Patient presents with    Follow-up     Lilliam Wilkins is a 44 y.o. year old  who comes to review her recent testing and discuss next steps.         Objective   Resp 14   Wt 65.3 kg (144 lb)   LMP  (LMP Unknown)   BMI 25.51 kg/m²     Lab Review   No data reviewed    Imaging   Pelvic ultrasound images independantly reviewed - there is appropriately placed IUD.  The previously seen left ovarian cyst has resolved fully       Assessment   Resolution of previously seen left ovarian cyst     Plan   No additional workup is needed unless new symptoms develop  The importance of keeping all planned follow-up and taking all medications as prescribed was emphasized.  Follow up for annual exam anytime              This note was electronically signed.    Red Crespo M.D.  2024    Part of this note may be an electronic transcription/translation of spoken language to printed text using the Dragon Dictation System.

## 2025-03-18 ENCOUNTER — OFFICE VISIT (OUTPATIENT)
Dept: OBSTETRICS AND GYNECOLOGY | Facility: CLINIC | Age: 45
End: 2025-03-18
Payer: COMMERCIAL

## 2025-03-18 VITALS
WEIGHT: 148 LBS | BODY MASS INDEX: 26.22 KG/M2 | SYSTOLIC BLOOD PRESSURE: 126 MMHG | RESPIRATION RATE: 14 BRPM | DIASTOLIC BLOOD PRESSURE: 72 MMHG

## 2025-03-18 DIAGNOSIS — Z01.419 WELL WOMAN EXAM WITH ROUTINE GYNECOLOGICAL EXAM: Primary | ICD-10-CM

## 2025-03-18 DIAGNOSIS — Z71.85 VACCINE COUNSELING: ICD-10-CM

## 2025-03-18 DIAGNOSIS — Z12.11 SCREEN FOR COLON CANCER: ICD-10-CM

## 2025-03-18 RX ORDER — TRETINOIN 0.5 MG/G
1 CREAM TOPICAL DAILY
COMMUNITY

## 2025-03-18 RX ORDER — HYDROXYZINE HYDROCHLORIDE 25 MG/1
TABLET, FILM COATED ORAL
COMMUNITY
Start: 2024-12-16

## 2025-03-18 NOTE — PROGRESS NOTES
Subjective   Chief Complaint   Patient presents with    Gynecologic Exam     Lilliam Wilkins is a 44 y.o. year old  presenting to be seen for her annual exam.  Got  in     SEXUAL Hx:  She is not currently sexually active.  In the past year there she has not been sexually active.    Condoms are not needed because she is not sexually active.  She would not like to be screened for STD's at today's exam.  Current birth control method: IUD - Mirena.  MENSTRUAL Hx:  No LMP recorded. Patient has had an implant.  In the past 6 months her cycles have been absent.  Her menstrual flow has been absent.   Each month on average there are roughly 0 day(s) of very heavy flow.  Intermenstrual bleeding is absent.    Post-coital bleeding is absent.  Dysmenorrhea: is not affecting her activities of daily living  PMS: is not affecting her activities of daily living  Her cycles are not a source of concern for her that she wishes to discuss today.  HEALTH Hx:  She exercises regularly: yes.  She wears her seat belt: yes.  She has concerns about domestic violence: no.    The following portions of the patient's history were reviewed and updated as appropriate:problem list, current medications, allergies, past family history, past medical history, past social history, and past surgical history.    Social History    Tobacco Use      Smoking status: Never        Passive exposure: Never      Smokeless tobacco: Never    Review of Systems  Constitutional POS: nothing reported    NEG: anorexia or night sweats   Genitourinary POS: nothing reported    NEG: dysuria or hematuria      Gastointestinal POS: nothing reported    NEG: bloating, change in bowel habits, melena, or reflux symptoms   Integument POS: nothing reported    NEG: moles that are changing in size, shape, color or rashes   Breast POS: nothing reported    NEG: persistent breast lump, skin dimpling, or nipple discharge        Objective   /72   Resp 14   Wt 67.1  kg (148 lb)   BMI 26.22 kg/m²     General:  well developed; well nourished  no acute distress  mentation appropriate   Skin:  No suspicious lesions seen   Thyroid: normal to inspection and palpation   Breasts:  Examined in supine position  Symmetric without masses or skin dimpling  Nipples normal without inversion, lesions or discharge  There are no palpable axillary nodes   Abdomen: soft, non-tender; no masses  no umbilical or inguinal hernias are present  no hepato-splenomegaly   Pelvis: Clinical staff was present for exam  External genitalia:  normal appearance of the external genitalia including Bartholin's and [place]'s glands.  :  urethral meatus normal; urethra normal:  Vaginal:  normal pink mucosa without prolapse or lesions.  Cervix:  normal appearance. IUD string present - 1 cms in length;  Uterus:  normal size, shape and consistency. retroverted;  Adnexa:  normal bimanual exam of the adnexa.  Rectal:  digital rectal exam not performed; anus visually normal appearing.        Assessment   Normal GYN exam     Plan   Pap was done today.  If she does not receive the results of the Pap within 2 weeks  time, she was instructed to call to find out the results.  I explained to Lilliam that the recommendations for Pap smear interval in a low risk patient's has lengthened to 3 years time.  I encouraged her to be seen yearly for a full physical exam including breast and pelvic exam even during the off years when PAP's will not be performed.  She was encouraged to get yearly mammograms.  She should report any palpable breast lump(s) or skin changes regardless of mammographic findings.  I explained to Lilliam that notification regarding her mammogram results will come from the center performing the study.  Our office will not be routinely calling with mammogram results.  It is her responsibility to make sure that the results from the mammogram are communicated to her by the breast center.  If she has any questions  about the results, she is welcome to call our office anytime.  Colonoscopy was recommended for screening for colon cancer.  The procedure was briefly discussed and its benefits for early detection of colon cancer were emphasized.  I explained to Lilliam that we could help her to schedule it if she wishes.  Additionally, she could also contact her primary care physician to help make this arrangement.  Alternatively, she could consider Cologuard (which would need to be done every 3 years).  If Cologuard was abnormal, colonoscopy will be required in follow-up.  In addition to being diagnostic, colonoscopy has the potential to be pre-emptive.  If a precancerous polyp was seen and removed, the chance of that polyp becoming cancerous is essentially eliminated.  Cologuard will not find the precancerous polyps as well as it does colon cancer.  Finally, Cologuard does not provide the opportunity to find and remove the precancerous polyps as readily as does colonoscopy.  After considering these options she wants help setting up her colonoscopy.  Referral will be made to Dr. Moralez for outpatient colonoscopy.  I have counseled the patient on the importance of staying up to date with preventive vaccines as well as the risks and benefits of these vaccines.  Her vaccine record was reviewed and updated.  I discussed with Lilliam that she may be behind on needed vaccinations for TDAP.  She may be able to obtain these vaccinations at her local pharmacy OR speak about obtaining them with her primary care.  If she does obtain her vaccines, I have asked Lilliam to let us know the date each vaccine was obtained so that her medical record could be updated in our system.  The importance of keeping all planned follow-up and taking all medications as prescribed was emphasized.  Follow up for annual exam 1 year           This note was electronically signed.    Red Crespo M.D.  March 18, 2025    Part of this note may be an  electronic transcription/translation of spoken language to printed text using the Dragon Dictation System.

## 2025-03-24 LAB — REF LAB TEST METHOD: NORMAL
